# Patient Record
Sex: MALE | Race: WHITE | HISPANIC OR LATINO | URBAN - METROPOLITAN AREA
[De-identification: names, ages, dates, MRNs, and addresses within clinical notes are randomized per-mention and may not be internally consistent; named-entity substitution may affect disease eponyms.]

---

## 2024-08-06 VITALS
HEART RATE: 49 BPM | WEIGHT: 136.03 LBS | TEMPERATURE: 97 F | HEIGHT: 66 IN | SYSTOLIC BLOOD PRESSURE: 171 MMHG | OXYGEN SATURATION: 99 % | RESPIRATION RATE: 15 BRPM | DIASTOLIC BLOOD PRESSURE: 70 MMHG

## 2024-08-06 RX ORDER — CHLORHEXIDINE GLUCONATE 500 MG/1
1 CLOTH TOPICAL ONCE
Refills: 0 | Status: DISCONTINUED | OUTPATIENT
Start: 2024-08-09 | End: 2024-08-09

## 2024-08-06 NOTE — H&P ADULT - NSHPLABSRESULTS_GEN_ALL_CORE
13.0   4.03  )-----------( 159      ( 09 Aug 2024 11:46 )             39.8       08-09    138  |  105  |  25<H>  ----------------------------<  203<H>  4.5   |  25  |  1.22    Ca    9.3      09 Aug 2024 11:46  Mg     2.0     08-09    TPro  7.2  /  Alb  3.9  /  TBili  0.8  /  DBili  x   /  AST  20  /  ALT  15  /  AlkPhos  67  08-09      PT/INR - ( 09 Aug 2024 11:46 )   PT: 11.8 sec;   INR: 1.04          PTT - ( 09 Aug 2024 11:46 )  PTT:32.8 sec    CARDIAC MARKERS ( 09 Aug 2024 11:46 )  x     / x     / x     / x     / 1.1 ng/mL      Urinalysis Basic - ( 09 Aug 2024 11:46 )    Color: x / Appearance: x / SG: x / pH: x  Gluc: 203 mg/dL / Ketone: x  / Bili: x / Urobili: x   Blood: x / Protein: x / Nitrite: x   Leuk Esterase: x / RBC: x / WBC x   Sq Epi: x / Non Sq Epi: x / Bacteria: x      EKG: sinus bradycardia, HR 52, first degree AV block, TWI leads I, aVL, V2-V6

## 2024-08-06 NOTE — H&P ADULT - NSICDXPASTMEDICALHX_GEN_ALL_CORE_FT
PAST MEDICAL HISTORY:  CAD (coronary artery disease)     DM II (diabetes mellitus, type II), controlled     HFrEF (heart failure with reduced ejection fraction)     HLD (hyperlipidemia)     HTN (hypertension)

## 2024-08-06 NOTE — H&P ADULT - HISTORY OF PRESENT ILLNESS
Cardiologist: Dr. Viktoria Lyman  HF: Dr. Ivet Bazan  Pharmacy:  Escort:    *Last dose of Xarelto 8/6, spoke w/ pt to confirm    79 y/o Tunisian-speaking M w/ PMHx of HTN, NIDDM, CAD s/p PCI dRCA, D1, LAD (2017), HFrEF (EF 35-40%), hx of LV thrombus (resolved) who presented to outpatient cardiologist Dr. Lyman for pre-op clearance prior to undergoing penile implant surgery. He endorses dyspnea on light exertion that is alleviated by rest and has been occurring for 1 year. He denies any ___ CP, palpitations, dizziness, syncope, diaphoresis, fatigue, LE edema, SOB, CROOK, orthopnea, PND, N/V/D, abd pain, cough, congestion, fever, chills or recent sick contact.    NST (2/17/23): large perfusion defect of severe intensity involving basal inferoseptal and entire mid apical LV including the apex which is essentially fixed and demonstrates transmural myocardial infarction w/ no signs of reversible ischemia    In light of pts risk factors, CCS class __ anginal symptoms and abnormal ____, pt now presents to [facility] for recommended cardiac catheterization with possible intervention if clinically indicated.     Cardiologist: Dr. Viktoria Lyman  HF: Dr. Ivet Bazan  Pharmacy:  Escort:    *Last dose of Xarelto 8/6, spoke w/ pt to confirm    77 y/o Thai-speaking M w/ PMHx of HTN, NIDDM, CAD s/p PCI dRCA, D1, LAD (2017), HFrEF (EF 35-40% in 2022), hx of LV thrombus (resolved) who presented to outpatient cardiologist Dr. Lyman for pre-op clearance prior to undergoing penile implant surgery. He endorses dyspnea on light exertion that is alleviated by rest and has been occurring for 1 year. He denies any ___ CP, palpitations, dizziness, syncope, diaphoresis, fatigue, LE edema, SOB, CROOK, orthopnea, PND, N/V/D, abd pain, cough, congestion, fever, chills or recent sick contact.    NST (2/17/23): large perfusion defect of severe intensity involving basal inferoseptal and entire mid apical LV including the apex which is essentially fixed and demonstrates transmural myocardial infarction w/ no signs of reversible ischemia  TTE (9/14/22): LVEF 34-40%, apex and mid anteroseptal walls are akinetic, septal hypertrophy, G1DD, mild MR and TR, RVSP 26, LV filling pressures are probably normal    In light of pts risk factors, CCS class __ anginal symptoms and abnormal ____, pt now presents to [facility] for recommended cardiac catheterization with possible intervention if clinically indicated.     Cardiologist: Dr. Viktoria Lyman  HF: Dr. Ivet Bazan  Pharmacy: [facility] pharmacy  Escort: darcy Lamas    77 y/o Georgian-speaking M w/ PMHx of HTN, NIDDM, CAD s/p PCI dRCA, D1, LAD (2017), HFrEF (EF 35-40% in 2022), hx of LV thrombus (2015, resolved, on Xarelto last dose 8/6) who presented to outpatient cardiologist Dr. Lyman for pre-op clearance prior to undergoing penile implant surgery. He endorses dyspnea on light exertion that is alleviated by rest and has been occurring for 1 year. Patient has a chronic dry cough that is worse at night.  He denies any current CP, palpitations, dizziness, syncope, diaphoresis, fatigue, LE edema, SOB, CROOK, orthopnea, PND, N/V/D, abd pain, congestion, fever, chills or recent sick contact.    NST (2/17/23): large perfusion defect of severe intensity involving basal inferoseptal and entire mid apical LV including the apex which is essentially fixed and demonstrates transmural myocardial infarction w/ no signs of reversible ischemia  TTE (9/14/22): LVEF 34-40%, apex and mid anteroseptal walls are akinetic, septal hypertrophy, G1DD, mild MR and TR, RVSP 26, LV filling pressures are probably normal    In light of pts risk factors, CCS class I anginal symptoms and abnormal NST, pt now presents to [facility] for recommended cardiac catheterization with possible intervention if clinically indicated.     Cardiologist: Dr. Viktoria Lyman  HF: Dr. Ivet Bazan  Pharmacy: [facility] pharmacy  Escort: darcy Lamas    79 y/o Citizen of [place]-speaking M w/ PMHx of HTN, NIDDM, CAD s/p PCI dRCA, D1, LAD (2017), HFrEF (EF 35-40% in 2022), hx of LV thrombus (2015, resolved, on Xarelto last dose 8/6) who presented to outpatient cardiologist Dr. Lyman for pre-op clearance prior to undergoing penile implant surgery. He endorses dyspnea on light exertion that is alleviated by rest and has been occurring for 1 year. Patient has a chronic dry cough that is worse at night for which he takes PRN meds.  He denies any current CP, palpitations, dizziness, syncope, diaphoresis, fatigue, LE edema, SOB, orthopnea, PND, N/V/D, abd pain, congestion, fever, chills or recent sick contact.    NST (2/17/23): large perfusion defect of severe intensity involving basal inferoseptal and entire mid apical LV including the apex which is essentially fixed and demonstrates transmural myocardial infarction w/ no signs of reversible ischemia  TTE (9/14/22): LVEF 34-40%, apex and mid anteroseptal walls are akinetic, septal hypertrophy, G1DD, mild MR and TR, RVSP 26, LV filling pressures are probably normal    In light of pts risk factors, CCS class I anginal symptoms and abnormal NST, pt now presents to [facility] for recommended cardiac catheterization with possible intervention if clinically indicated. Cardiologist: Dr. Viktoria Lyman  HF: Dr. Ivet Bazan  Pharmacy: [facility] pharmacy  Escort: darcy Lamas    77 y/o Slovak-speaking M w/ PMHx of HTN, NIDDM, CAD s/p PCI dRCA, D1, LAD (2017), HFrEF (EF 35-40% in 2022), hx of LV thrombus (2020, resolved, on Xarelto last dose 8/6), TIA (2009) who presented to outpatient cardiologist Dr. Lyman for pre-op clearance prior to undergoing penile implant surgery. He endorses dyspnea on light exertion that is alleviated by rest and has been occurring for 1 year. Patient has a chronic dry cough that is worse at night for which he takes PRN meds.  He denies any current CP, palpitations, dizziness, syncope, diaphoresis, fatigue, LE edema, SOB, orthopnea, PND, N/V/D, abd pain, congestion, fever, chills or recent sick contact.    NST (2/17/23): large perfusion defect of severe intensity involving basal inferoseptal and entire mid apical LV including the apex which is essentially fixed and demonstrates transmural myocardial infarction w/ no signs of reversible ischemia  TTE (9/14/22): LVEF 34-40%, apex and mid anteroseptal walls are akinetic, septal hypertrophy, G1DD, mild MR and TR, RVSP 26, LV filling pressures are probably normal    In light of pts risk factors, CCS class I anginal symptoms and abnormal NST, pt now presents to [facility] for recommended cardiac catheterization with possible intervention if clinically indicated.

## 2024-08-09 ENCOUNTER — OUTPATIENT (OUTPATIENT)
Dept: OUTPATIENT SERVICES | Facility: HOSPITAL | Age: 79
LOS: 1 days | Discharge: HOME | End: 2024-08-09
Payer: MEDICARE

## 2024-08-09 DIAGNOSIS — Z90.49 ACQUIRED ABSENCE OF OTHER SPECIFIED PARTS OF DIGESTIVE TRACT: Chronic | ICD-10-CM

## 2024-08-09 DIAGNOSIS — Z98.49 CATARACT EXTRACTION STATUS, UNSPECIFIED EYE: Chronic | ICD-10-CM

## 2024-08-09 LAB
ALBUMIN SERPL ELPH-MCNC: 3.9 G/DL — SIGNIFICANT CHANGE UP (ref 3.3–5)
ALP SERPL-CCNC: 67 U/L — SIGNIFICANT CHANGE UP (ref 40–120)
ALT FLD-CCNC: 15 U/L — SIGNIFICANT CHANGE UP (ref 10–45)
ANION GAP SERPL CALC-SCNC: 7 MMOL/L — SIGNIFICANT CHANGE UP (ref 5–17)
ANION GAP SERPL CALC-SCNC: 8 MMOL/L — SIGNIFICANT CHANGE UP (ref 5–17)
APTT BLD: 32.8 SEC — SIGNIFICANT CHANGE UP (ref 24.5–35.6)
AST SERPL-CCNC: 20 U/L — SIGNIFICANT CHANGE UP (ref 10–40)
BASOPHILS # BLD AUTO: 0.02 K/UL — SIGNIFICANT CHANGE UP (ref 0–0.2)
BASOPHILS NFR BLD AUTO: 0.5 % — SIGNIFICANT CHANGE UP (ref 0–2)
BILIRUB SERPL-MCNC: 0.8 MG/DL — SIGNIFICANT CHANGE UP (ref 0.2–1.2)
BUN SERPL-MCNC: 20 MG/DL — SIGNIFICANT CHANGE UP (ref 7–23)
BUN SERPL-MCNC: 25 MG/DL — HIGH (ref 7–23)
CALCIUM SERPL-MCNC: 8.3 MG/DL — LOW (ref 8.4–10.5)
CALCIUM SERPL-MCNC: 9.3 MG/DL — SIGNIFICANT CHANGE UP (ref 8.4–10.5)
CHLORIDE SERPL-SCNC: 105 MMOL/L — SIGNIFICANT CHANGE UP (ref 96–108)
CHLORIDE SERPL-SCNC: 106 MMOL/L — SIGNIFICANT CHANGE UP (ref 96–108)
CHOLEST SERPL-MCNC: 152 MG/DL — SIGNIFICANT CHANGE UP
CK MB CFR SERPL CALC: 1.1 NG/ML — SIGNIFICANT CHANGE UP (ref 0–6.7)
CK SERPL-CCNC: 46 U/L — SIGNIFICANT CHANGE UP (ref 30–200)
CO2 SERPL-SCNC: 23 MMOL/L — SIGNIFICANT CHANGE UP (ref 22–31)
CO2 SERPL-SCNC: 25 MMOL/L — SIGNIFICANT CHANGE UP (ref 22–31)
CREAT SERPL-MCNC: 1.15 MG/DL — SIGNIFICANT CHANGE UP (ref 0.5–1.3)
CREAT SERPL-MCNC: 1.22 MG/DL — SIGNIFICANT CHANGE UP (ref 0.5–1.3)
EGFR: 61 ML/MIN/1.73M2 — SIGNIFICANT CHANGE UP
EGFR: 65 ML/MIN/1.73M2 — SIGNIFICANT CHANGE UP
EOSINOPHIL # BLD AUTO: 0.16 K/UL — SIGNIFICANT CHANGE UP (ref 0–0.5)
EOSINOPHIL NFR BLD AUTO: 4 % — SIGNIFICANT CHANGE UP (ref 0–6)
GLUCOSE BLDC GLUCOMTR-MCNC: 135 MG/DL — HIGH (ref 70–99)
GLUCOSE BLDC GLUCOMTR-MCNC: 187 MG/DL — HIGH (ref 70–99)
GLUCOSE SERPL-MCNC: 203 MG/DL — HIGH (ref 70–99)
GLUCOSE SERPL-MCNC: 267 MG/DL — HIGH (ref 70–99)
HCT VFR BLD CALC: 35.5 % — LOW (ref 39–50)
HCT VFR BLD CALC: 39.8 % — SIGNIFICANT CHANGE UP (ref 39–50)
HDLC SERPL-MCNC: 63 MG/DL — SIGNIFICANT CHANGE UP
HGB BLD-MCNC: 11.8 G/DL — LOW (ref 13–17)
HGB BLD-MCNC: 13 G/DL — SIGNIFICANT CHANGE UP (ref 13–17)
IMM GRANULOCYTES NFR BLD AUTO: 0.2 % — SIGNIFICANT CHANGE UP (ref 0–0.9)
INR BLD: 1.04 — SIGNIFICANT CHANGE UP (ref 0.85–1.18)
LIPID PNL WITH DIRECT LDL SERPL: 75 MG/DL — SIGNIFICANT CHANGE UP
LYMPHOCYTES # BLD AUTO: 1.44 K/UL — SIGNIFICANT CHANGE UP (ref 1–3.3)
LYMPHOCYTES # BLD AUTO: 35.7 % — SIGNIFICANT CHANGE UP (ref 13–44)
MAGNESIUM SERPL-MCNC: 1.6 MG/DL — SIGNIFICANT CHANGE UP (ref 1.6–2.6)
MAGNESIUM SERPL-MCNC: 2 MG/DL — SIGNIFICANT CHANGE UP (ref 1.6–2.6)
MCHC RBC-ENTMCNC: 29.5 PG — SIGNIFICANT CHANGE UP (ref 27–34)
MCHC RBC-ENTMCNC: 29.6 PG — SIGNIFICANT CHANGE UP (ref 27–34)
MCHC RBC-ENTMCNC: 32.7 GM/DL — SIGNIFICANT CHANGE UP (ref 32–36)
MCHC RBC-ENTMCNC: 33.2 GM/DL — SIGNIFICANT CHANGE UP (ref 32–36)
MCV RBC AUTO: 89.2 FL — SIGNIFICANT CHANGE UP (ref 80–100)
MCV RBC AUTO: 90.2 FL — SIGNIFICANT CHANGE UP (ref 80–100)
MONOCYTES # BLD AUTO: 0.5 K/UL — SIGNIFICANT CHANGE UP (ref 0–0.9)
MONOCYTES NFR BLD AUTO: 12.4 % — SIGNIFICANT CHANGE UP (ref 2–14)
NEUTROPHILS # BLD AUTO: 1.9 K/UL — SIGNIFICANT CHANGE UP (ref 1.8–7.4)
NEUTROPHILS NFR BLD AUTO: 47.2 % — SIGNIFICANT CHANGE UP (ref 43–77)
NON HDL CHOLESTEROL: 89 MG/DL — SIGNIFICANT CHANGE UP
NRBC # BLD: 0 /100 WBCS — SIGNIFICANT CHANGE UP (ref 0–0)
NRBC # BLD: 0 /100 WBCS — SIGNIFICANT CHANGE UP (ref 0–0)
PLATELET # BLD AUTO: 134 K/UL — LOW (ref 150–400)
PLATELET # BLD AUTO: 159 K/UL — SIGNIFICANT CHANGE UP (ref 150–400)
POTASSIUM SERPL-MCNC: 4.3 MMOL/L — SIGNIFICANT CHANGE UP (ref 3.5–5.3)
POTASSIUM SERPL-MCNC: 4.5 MMOL/L — SIGNIFICANT CHANGE UP (ref 3.5–5.3)
POTASSIUM SERPL-SCNC: 4.3 MMOL/L — SIGNIFICANT CHANGE UP (ref 3.5–5.3)
POTASSIUM SERPL-SCNC: 4.5 MMOL/L — SIGNIFICANT CHANGE UP (ref 3.5–5.3)
PROT SERPL-MCNC: 7.2 G/DL — SIGNIFICANT CHANGE UP (ref 6–8.3)
PROTHROM AB SERPL-ACNC: 11.8 SEC — SIGNIFICANT CHANGE UP (ref 9.5–13)
RBC # BLD: 3.98 M/UL — LOW (ref 4.2–5.8)
RBC # BLD: 4.41 M/UL — SIGNIFICANT CHANGE UP (ref 4.2–5.8)
RBC # FLD: 13.3 % — SIGNIFICANT CHANGE UP (ref 10.3–14.5)
RBC # FLD: 13.3 % — SIGNIFICANT CHANGE UP (ref 10.3–14.5)
SODIUM SERPL-SCNC: 136 MMOL/L — SIGNIFICANT CHANGE UP (ref 135–145)
SODIUM SERPL-SCNC: 138 MMOL/L — SIGNIFICANT CHANGE UP (ref 135–145)
TRIGL SERPL-MCNC: 74 MG/DL — SIGNIFICANT CHANGE UP
WBC # BLD: 3.28 K/UL — LOW (ref 3.8–10.5)
WBC # BLD: 4.03 K/UL — SIGNIFICANT CHANGE UP (ref 3.8–10.5)
WBC # FLD AUTO: 3.28 K/UL — LOW (ref 3.8–10.5)
WBC # FLD AUTO: 4.03 K/UL — SIGNIFICANT CHANGE UP (ref 3.8–10.5)

## 2024-08-09 PROCEDURE — C1769: CPT

## 2024-08-09 PROCEDURE — 80048 BASIC METABOLIC PNL TOTAL CA: CPT

## 2024-08-09 PROCEDURE — 99152 MOD SED SAME PHYS/QHP 5/>YRS: CPT

## 2024-08-09 PROCEDURE — 82962 GLUCOSE BLOOD TEST: CPT

## 2024-08-09 PROCEDURE — 92979 ENDOLUMINL IVUS OCT C EA: CPT | Mod: 26,LD

## 2024-08-09 PROCEDURE — C1725: CPT

## 2024-08-09 PROCEDURE — C1753: CPT

## 2024-08-09 PROCEDURE — 80061 LIPID PANEL: CPT

## 2024-08-09 PROCEDURE — C9600: CPT | Mod: LD

## 2024-08-09 PROCEDURE — 93458 L HRT ARTERY/VENTRICLE ANGIO: CPT | Mod: 26,59

## 2024-08-09 PROCEDURE — C1887: CPT

## 2024-08-09 PROCEDURE — 92978 ENDOLUMINL IVUS OCT C 1ST: CPT | Mod: LM

## 2024-08-09 PROCEDURE — 93005 ELECTROCARDIOGRAM TRACING: CPT

## 2024-08-09 PROCEDURE — 83735 ASSAY OF MAGNESIUM: CPT

## 2024-08-09 PROCEDURE — 92928 PRQ TCAT PLMT NTRAC ST 1 LES: CPT | Mod: LM

## 2024-08-09 PROCEDURE — 85025 COMPLETE CBC W/AUTO DIFF WBC: CPT

## 2024-08-09 PROCEDURE — 85027 COMPLETE CBC AUTOMATED: CPT

## 2024-08-09 PROCEDURE — C1874: CPT

## 2024-08-09 PROCEDURE — 82550 ASSAY OF CK (CPK): CPT

## 2024-08-09 PROCEDURE — 36415 COLL VENOUS BLD VENIPUNCTURE: CPT

## 2024-08-09 PROCEDURE — 93010 ELECTROCARDIOGRAM REPORT: CPT

## 2024-08-09 PROCEDURE — 80053 COMPREHEN METABOLIC PANEL: CPT

## 2024-08-09 PROCEDURE — 92978 ENDOLUMINL IVUS OCT C 1ST: CPT | Mod: 26,LM

## 2024-08-09 PROCEDURE — 92979 ENDOLUMINL IVUS OCT C EA: CPT | Mod: LD

## 2024-08-09 PROCEDURE — 85610 PROTHROMBIN TIME: CPT

## 2024-08-09 PROCEDURE — C1894: CPT

## 2024-08-09 PROCEDURE — 85730 THROMBOPLASTIN TIME PARTIAL: CPT

## 2024-08-09 PROCEDURE — 93458 L HRT ARTERY/VENTRICLE ANGIO: CPT | Mod: 59

## 2024-08-09 PROCEDURE — 82553 CREATINE MB FRACTION: CPT

## 2024-08-09 RX ORDER — MAGNESIUM OXIDE 253 MG/1
800 TABLET ORAL ONCE
Refills: 0 | Status: COMPLETED | OUTPATIENT
Start: 2024-08-09 | End: 2024-08-09

## 2024-08-09 RX ORDER — GUAIFENESIN/DEXTROMETHORPHAN 100-10MG/5
1 SYRUP ORAL
Refills: 0 | DISCHARGE

## 2024-08-09 RX ORDER — ATORVASTATIN CALCIUM 40 MG/1
1 TABLET, FILM COATED ORAL
Refills: 0 | DISCHARGE

## 2024-08-09 RX ORDER — VERICIGUAT 2.5 MG/1
1 TABLET, FILM COATED ORAL
Refills: 0 | DISCHARGE

## 2024-08-09 RX ORDER — CARVEDILOL PHOSPHATE 80 MG
1 CAPSULE,EXTENDED RELEASE MULTIPHASE 24HR ORAL
Refills: 0 | DISCHARGE

## 2024-08-09 RX ORDER — BACTERIOSTATIC SODIUM CHLORIDE 0.9 %
1000 VIAL (ML) INJECTION
Refills: 0 | Status: ACTIVE | OUTPATIENT
Start: 2024-08-09 | End: 2024-08-09

## 2024-08-09 RX ORDER — BACTERIOSTATIC SODIUM CHLORIDE 0.9 %
250 VIAL (ML) INJECTION ONCE
Refills: 0 | Status: COMPLETED | OUTPATIENT
Start: 2024-08-09 | End: 2024-08-09

## 2024-08-09 RX ORDER — BACTERIOSTATIC SODIUM CHLORIDE 0.9 %
1000 VIAL (ML) INJECTION
Refills: 0 | Status: COMPLETED | OUTPATIENT
Start: 2024-08-09 | End: 2024-08-09

## 2024-08-09 RX ORDER — RIVAROXABAN 15 MG-20MG
1 KIT ORAL
Refills: 0 | DISCHARGE

## 2024-08-09 RX ORDER — SACUBITRIL AND VALSARTAN 49; 51 MG/1; MG/1
1 TABLET, FILM COATED ORAL
Refills: 0 | DISCHARGE

## 2024-08-09 RX ORDER — CLOPIDOGREL BISULFATE 75 MG/1
600 TABLET, FILM COATED ORAL ONCE
Refills: 0 | Status: COMPLETED | OUTPATIENT
Start: 2024-08-09 | End: 2024-08-09

## 2024-08-09 RX ORDER — SITAGLIPTIN AND METFORMIN HYDROCHLORIDE 50; 500 MG/1; MG/1
1 TABLET, FILM COATED ORAL
Refills: 0 | DISCHARGE

## 2024-08-09 RX ORDER — EMPAGLIFLOZIN 25 MG/1
1 TABLET, FILM COATED ORAL
Refills: 0 | DISCHARGE

## 2024-08-09 RX ORDER — ASPIRIN 500 MG
1 TABLET ORAL
Refills: 0 | DISCHARGE

## 2024-08-09 RX ORDER — ASPIRIN 500 MG
81 TABLET ORAL ONCE
Refills: 0 | Status: COMPLETED | OUTPATIENT
Start: 2024-08-09 | End: 2024-08-09

## 2024-08-09 RX ORDER — ASPIRIN 500 MG
162 TABLET ORAL ONCE
Refills: 0 | Status: DISCONTINUED | OUTPATIENT
Start: 2024-08-09 | End: 2024-08-09

## 2024-08-09 RX ORDER — CLOPIDOGREL BISULFATE 75 MG/1
1 TABLET, FILM COATED ORAL
Qty: 30 | Refills: 3
Start: 2024-08-09 | End: 2024-12-06

## 2024-08-09 RX ORDER — ASPIRIN 500 MG
1 TABLET ORAL
Qty: 30 | Refills: 3
Start: 2024-08-09 | End: 2024-12-06

## 2024-08-09 RX ORDER — ASPIRIN 500 MG
81 TABLET ORAL DAILY
Refills: 0 | Status: DISCONTINUED | OUTPATIENT
Start: 2024-08-09 | End: 2024-08-09

## 2024-08-09 RX ADMIN — MAGNESIUM OXIDE 800 MILLIGRAM(S): 253 TABLET ORAL at 20:00

## 2024-08-09 RX ADMIN — Medication 120 MILLILITER(S): at 17:13

## 2024-08-09 RX ADMIN — CLOPIDOGREL BISULFATE 600 MILLIGRAM(S): 75 TABLET, FILM COATED ORAL at 13:50

## 2024-08-09 RX ADMIN — Medication 250 MILLILITER(S): at 13:49

## 2024-08-09 RX ADMIN — Medication 50 MILLILITER(S): at 13:49

## 2024-08-09 RX ADMIN — Medication 81 MILLIGRAM(S): at 14:12

## 2024-08-09 NOTE — PROGRESS NOTE ADULT - SUBJECTIVE AND OBJECTIVE BOX
Interventional Cardiology PA Post PCI SDA Discharge Note    Patient without complaints. Ambulated and voided without difficulties    Afebrile, VSS    PRESCRIPTIONS/HOME MEDICATIONS:  Aspirin EC 81 mg oral delayed release tablet: 1 tab(s) orally once a day  clopidogrel 75 mg oral tablet: 1 tab(s) orally once a day  atorvastatin 40 mg oral tablet: 1 tab(s) orally once a day (at bedtime)  carvedilol 6.25 mg oral tablet: 1 tab(s) orally 2 times a day  Entresto 49 mg-51 mg oral tablet: 1 tab(s) orally 2 times a day  guaifenesin-dextromethorphan 400 mg-20 mg oral tablet: 1 tab(s) orally prn  Janumet  mg-1000 mg oral tablet, extended release: 1 tab(s) orally once a day  Jardiance 25 mg oral tablet: 1 tab(s) orally once a day  Verquvo 2.5 mg oral tablet: 1 tab(s) orally once a day  Xarelto 2.5 mg oral tablet: 1 tab(s) orally 2 times a day    Ext:  Right Radial: no hematoma, no bleeding, dressing; C/D/I      Pulses:  intact RAD/DP/PT to baseline     A/P: 78M, Albanian-speaking, w/ PMHx of HTN, NIDDM, CAD s/p PCI (MARGIE to dRCA, D1, LAD  in 2017, on xarelto 2.5), HFrEF (EF 35-40% in 2022), hx of LV thrombus (2020, resolved), and TIA (2009), presented to [facility] for recommended cardiac cath w/ possible intervention in light of pts risk factors, CCS class I anginal symptoms and abnormal NST. Pt now s/p MARGIE dLM-pLAD (90% LM and 70% pLAD); mLAD patent stent, D1 patent stent, dLCx patent stent, OM1 mild dz, p/mRCA patent stent, RPL 70%, EDP 5, access R radial TR. Will consider staged PCI of residual RPL if pt symptomatic.    1.     Follow-up with PMD/Cardiologist Esmer in 72 hours.  2.     Post procedure labs/EKG reviewed and stable.    3.     Pt given instructions on importance of taking antiplatelet medication(s).    4.     Stable for discharge as per attending Dr. Lyman after bed rest, pt voids,wrist stable and 30 minutes of ambulation.  5.     Prescriptions for Aspirin/Plavix e-prescribed and submitted to patient's pharmacy.  6.     Patient will continue Lipitor 40mg  7.     Patient will continue All Other Home Medications.  8.     Is patient a Current Smoker: No?   9.     Cardiac Rehab (post PCI):   NOT PROVIDED 2/2 RESIDUAL CAD PENDING POSSIBLE STAGED PCI.  10.   CVD ( (STEMI/NSTEMI/ACS/UA &/OR Post PCI this admission): GLP-1 receptor agonist, SGLT2 inhibitor meds discussed w/ patient and encouraged to discuss further with PMD or endo at next visit: Yes  11.   Discharge forms signed and copies in chart

## 2024-08-14 DIAGNOSIS — I25.118 ATHEROSCLEROTIC HEART DISEASE OF NATIVE CORONARY ARTERY WITH OTHER FORMS OF ANGINA PECTORIS: ICD-10-CM

## 2024-08-14 DIAGNOSIS — Z95.5 PRESENCE OF CORONARY ANGIOPLASTY IMPLANT AND GRAFT: ICD-10-CM

## 2024-08-14 DIAGNOSIS — R94.39 ABNORMAL RESULT OF OTHER CARDIOVASCULAR FUNCTION STUDY: ICD-10-CM
